# Patient Record
Sex: MALE | Race: OTHER | HISPANIC OR LATINO | URBAN - METROPOLITAN AREA
[De-identification: names, ages, dates, MRNs, and addresses within clinical notes are randomized per-mention and may not be internally consistent; named-entity substitution may affect disease eponyms.]

---

## 2017-07-12 ENCOUNTER — EMERGENCY (EMERGENCY)
Facility: HOSPITAL | Age: 47
LOS: 1 days | Discharge: HOME | End: 2017-07-12
Attending: EMERGENCY MEDICINE
Payer: COMMERCIAL

## 2017-07-12 VITALS
WEIGHT: 179.9 LBS | OXYGEN SATURATION: 100 % | TEMPERATURE: 209 F | HEIGHT: 68 IN | HEART RATE: 58 BPM | DIASTOLIC BLOOD PRESSURE: 92 MMHG | RESPIRATION RATE: 19 BRPM | SYSTOLIC BLOOD PRESSURE: 142 MMHG
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
## 2017-07-12 DIAGNOSIS — S43.101A UNSPECIFIED DISLOCATION OF RIGHT ACROMIOCLAVICULAR JOINT, INITIAL ENCOUNTER: ICD-10-CM

## 2017-07-12 PROCEDURE — 99284 EMERGENCY DEPT VISIT MOD MDM: CPT

## 2017-07-12 PROCEDURE — 73030 X-RAY EXAM OF SHOULDER: CPT | Mod: 26,RT

## 2017-07-12 PROCEDURE — 99283 EMERGENCY DEPT VISIT LOW MDM: CPT

## 2017-07-12 PROCEDURE — 73030 X-RAY EXAM OF SHOULDER: CPT
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
## 2017-07-12 NOTE — ED PROVIDER NOTE - MEDICAL DECISION MAKING DETAILS
R shoulder pain, + ac joint separation on xray, [place] RN placed in sling, pt medicated prior to arrival, give rx (printed as pt is out of area) and follow up with [place]/Ortho.
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
## 2021-04-30 NOTE — ED ADULT NURSE NOTE - FALL HARM RISK TYPE OF ASSESSMENT
Impression: Chalazion left upper eyelid: H00.14. Plan: Prescribed Doxycycline 100mg BID. Start warm compresses.  Refer to Dr. Denita William
Impression: Dry eye syndrome of bilateral lacrimal glands: H04.123. Plan: Discussed diagnosis with patient. Recommend artificial tears for comfort.
Admission

## 2024-01-17 ENCOUNTER — EMERGENCY (EMERGENCY)
Facility: HOSPITAL | Age: 54
LOS: 1 days | Discharge: HOME | End: 2024-01-17
Attending: STUDENT IN AN ORGANIZED HEALTH CARE EDUCATION/TRAINING PROGRAM
Payer: COMMERCIAL

## 2024-01-17 VITALS
HEIGHT: 68 IN | DIASTOLIC BLOOD PRESSURE: 95 MMHG | OXYGEN SATURATION: 99 % | WEIGHT: 167.99 LBS | SYSTOLIC BLOOD PRESSURE: 137 MMHG | HEART RATE: 85 BPM | RESPIRATION RATE: 19 BRPM | TEMPERATURE: 99 F

## 2024-01-17 VITALS
OXYGEN SATURATION: 98 % | HEART RATE: 73 BPM | TEMPERATURE: 98 F | DIASTOLIC BLOOD PRESSURE: 83 MMHG | SYSTOLIC BLOOD PRESSURE: 129 MMHG | RESPIRATION RATE: 18 BRPM

## 2024-01-17 LAB
ALBUMIN SERPL ELPH-MCNC: 4 G/DL — SIGNIFICANT CHANGE UP (ref 3.5–5)
ALP SERPL-CCNC: 66 U/L — SIGNIFICANT CHANGE UP (ref 40–120)
ALT FLD-CCNC: 51 U/L DA — SIGNIFICANT CHANGE UP (ref 10–60)
ANION GAP SERPL CALC-SCNC: 5 MMOL/L — SIGNIFICANT CHANGE UP (ref 5–17)
AST SERPL-CCNC: 23 U/L — SIGNIFICANT CHANGE UP (ref 10–40)
BASOPHILS # BLD AUTO: 0.03 K/UL — SIGNIFICANT CHANGE UP (ref 0–0.2)
BASOPHILS NFR BLD AUTO: 0.2 % — SIGNIFICANT CHANGE UP (ref 0–2)
BILIRUB SERPL-MCNC: 0.8 MG/DL — SIGNIFICANT CHANGE UP (ref 0.2–1.2)
BUN SERPL-MCNC: 14 MG/DL — SIGNIFICANT CHANGE UP (ref 7–18)
CALCIUM SERPL-MCNC: 9.5 MG/DL — SIGNIFICANT CHANGE UP (ref 8.4–10.5)
CHLORIDE SERPL-SCNC: 101 MMOL/L — SIGNIFICANT CHANGE UP (ref 96–108)
CO2 SERPL-SCNC: 26 MMOL/L — SIGNIFICANT CHANGE UP (ref 22–31)
CREAT SERPL-MCNC: 0.79 MG/DL — SIGNIFICANT CHANGE UP (ref 0.5–1.3)
EGFR: 108 ML/MIN/1.73M2 — SIGNIFICANT CHANGE UP
EOSINOPHIL # BLD AUTO: 0.01 K/UL — SIGNIFICANT CHANGE UP (ref 0–0.5)
EOSINOPHIL NFR BLD AUTO: 0.1 % — SIGNIFICANT CHANGE UP (ref 0–6)
GLUCOSE SERPL-MCNC: 135 MG/DL — HIGH (ref 70–99)
HCT VFR BLD CALC: 46.5 % — SIGNIFICANT CHANGE UP (ref 39–50)
HGB BLD-MCNC: 15.9 G/DL — SIGNIFICANT CHANGE UP (ref 13–17)
IMM GRANULOCYTES NFR BLD AUTO: 0.5 % — SIGNIFICANT CHANGE UP (ref 0–0.9)
LYMPHOCYTES # BLD AUTO: 0.57 K/UL — LOW (ref 1–3.3)
LYMPHOCYTES # BLD AUTO: 4.4 % — LOW (ref 13–44)
MCHC RBC-ENTMCNC: 31.4 PG — SIGNIFICANT CHANGE UP (ref 27–34)
MCHC RBC-ENTMCNC: 34.2 GM/DL — SIGNIFICANT CHANGE UP (ref 32–36)
MCV RBC AUTO: 91.7 FL — SIGNIFICANT CHANGE UP (ref 80–100)
MONOCYTES # BLD AUTO: 0.65 K/UL — SIGNIFICANT CHANGE UP (ref 0–0.9)
MONOCYTES NFR BLD AUTO: 5 % — SIGNIFICANT CHANGE UP (ref 2–14)
NEUTROPHILS # BLD AUTO: 11.76 K/UL — HIGH (ref 1.8–7.4)
NEUTROPHILS NFR BLD AUTO: 89.8 % — HIGH (ref 43–77)
NRBC # BLD: 0 /100 WBCS — SIGNIFICANT CHANGE UP (ref 0–0)
PLATELET # BLD AUTO: 279 K/UL — SIGNIFICANT CHANGE UP (ref 150–400)
POTASSIUM SERPL-MCNC: 3.6 MMOL/L — SIGNIFICANT CHANGE UP (ref 3.5–5.3)
POTASSIUM SERPL-SCNC: 3.6 MMOL/L — SIGNIFICANT CHANGE UP (ref 3.5–5.3)
PROT SERPL-MCNC: 8.6 G/DL — HIGH (ref 6–8.3)
RAPID RVP RESULT: SIGNIFICANT CHANGE UP
RBC # BLD: 5.07 M/UL — SIGNIFICANT CHANGE UP (ref 4.2–5.8)
RBC # FLD: 12 % — SIGNIFICANT CHANGE UP (ref 10.3–14.5)
SARS-COV-2 RNA SPEC QL NAA+PROBE: SIGNIFICANT CHANGE UP
SODIUM SERPL-SCNC: 132 MMOL/L — LOW (ref 135–145)
WBC # BLD: 13.08 K/UL — HIGH (ref 3.8–10.5)
WBC # FLD AUTO: 13.08 K/UL — HIGH (ref 3.8–10.5)

## 2024-01-17 PROCEDURE — 0225U NFCT DS DNA&RNA 21 SARSCOV2: CPT

## 2024-01-17 PROCEDURE — 85025 COMPLETE CBC W/AUTO DIFF WBC: CPT

## 2024-01-17 PROCEDURE — 70491 CT SOFT TISSUE NECK W/DYE: CPT | Mod: 26,MA

## 2024-01-17 PROCEDURE — 99284 EMERGENCY DEPT VISIT MOD MDM: CPT | Mod: 25

## 2024-01-17 PROCEDURE — 80053 COMPREHEN METABOLIC PANEL: CPT

## 2024-01-17 PROCEDURE — 99285 EMERGENCY DEPT VISIT HI MDM: CPT

## 2024-01-17 PROCEDURE — 96361 HYDRATE IV INFUSION ADD-ON: CPT

## 2024-01-17 PROCEDURE — 96374 THER/PROPH/DIAG INJ IV PUSH: CPT | Mod: XU

## 2024-01-17 PROCEDURE — 70491 CT SOFT TISSUE NECK W/DYE: CPT | Mod: MA

## 2024-01-17 PROCEDURE — 36415 COLL VENOUS BLD VENIPUNCTURE: CPT

## 2024-01-17 PROCEDURE — 96375 TX/PRO/DX INJ NEW DRUG ADDON: CPT | Mod: XU

## 2024-01-17 RX ORDER — DEXAMETHASONE 0.5 MG/5ML
10 ELIXIR ORAL ONCE
Refills: 0 | Status: COMPLETED | OUTPATIENT
Start: 2024-01-17 | End: 2024-01-17

## 2024-01-17 RX ORDER — KETOROLAC TROMETHAMINE 30 MG/ML
15 SYRINGE (ML) INJECTION ONCE
Refills: 0 | Status: DISCONTINUED | OUTPATIENT
Start: 2024-01-17 | End: 2024-01-17

## 2024-01-17 RX ORDER — IBUPROFEN 200 MG
1 TABLET ORAL
Qty: 20 | Refills: 0
Start: 2024-01-17

## 2024-01-17 RX ORDER — TETRACAINE/BENZOCAINE/BUTAMBEN 2%-14%-2%
1 OINTMENT (GRAM) TOPICAL ONCE
Refills: 0 | Status: COMPLETED | OUTPATIENT
Start: 2024-01-17 | End: 2024-01-17

## 2024-01-17 RX ORDER — SODIUM CHLORIDE 9 MG/ML
1000 INJECTION, SOLUTION INTRAVENOUS ONCE
Refills: 0 | Status: COMPLETED | OUTPATIENT
Start: 2024-01-17 | End: 2024-01-17

## 2024-01-17 RX ADMIN — SODIUM CHLORIDE 1000 MILLILITER(S): 9 INJECTION, SOLUTION INTRAVENOUS at 16:40

## 2024-01-17 RX ADMIN — Medication 1 SPRAY(S): at 18:05

## 2024-01-17 RX ADMIN — Medication 15 MILLIGRAM(S): at 15:34

## 2024-01-17 RX ADMIN — Medication 102 MILLIGRAM(S): at 18:50

## 2024-01-17 RX ADMIN — Medication 100 MILLIGRAM(S): at 18:50

## 2024-01-17 RX ADMIN — SODIUM CHLORIDE 1000 MILLILITER(S): 9 INJECTION, SOLUTION INTRAVENOUS at 15:40

## 2024-01-17 RX ADMIN — Medication 15 MILLIGRAM(S): at 00:00

## 2024-01-17 NOTE — ED PROVIDER NOTE - CLINICAL SUMMARY MEDICAL DECISION MAKING FREE TEXT BOX
Jayme: 53-year-old male with no pertinent past medical history presents with sore throat since yesterday.  Patient reports cough x 1 week, reports worsening cough since yesterday associated with sore throat, nasal congestion, and hoarseness.  Patient was seen at urgent care today, had negative rapid strep testing, throat culture sent, given Augmentin and 10 mg Decadron, sent to the emergency department for concern for peritonsillar abscess.  Patient reports left-sided throat pain, worse with swallowing.  Denies any fevers, drooling, shortness of breath, chest pain, abdominal pain, vomiting, diarrhea, dysuria, numbness, focal weakness, rash.  Patient tolerating oral intake.  Physical exam per above. No clear abscess on exam, uvula midline, no trismus. Will obtain labs, imaging r/o abscess, provide supportive treatment with dispo pending workup.

## 2024-01-17 NOTE — ED ADULT TRIAGE NOTE - CHIEF COMPLAINT QUOTE
cough x 1 week  sore throat x 2 days, nausea cough x 1 week  sore throat x 2 days, nausea  sent by Urgent Care Physician for darlin tonsillar abscess

## 2024-01-17 NOTE — ED PROVIDER NOTE - OBJECTIVE STATEMENT
53-year-old male with no pertinent past medical history presents with sore throat since yesterday.  Patient reports cough x 1 week, reports worsening cough since yesterday associated with sore throat, nasal congestion, and hoarseness.  Patient was seen at urgent care today, had negative rapid strep testing, throat culture sent, given Augmentin and 10 mg Decadron, sent to the emergency department for concern for peritonsillar abscess.  Patient reports left-sided throat pain, worse with swallowing.  Denies any fevers, drooling, shortness of breath, chest pain, abdominal pain, vomiting, diarrhea, dysuria, numbness, focal weakness, rash.  Patient tolerating oral intake.  Denies any additional complaints.

## 2024-01-17 NOTE — ED ADULT NURSE NOTE - CHIEF COMPLAINT QUOTE
cough x 1 week  sore throat x 2 days, nausea  sent by Urgent Care Physician for darlin tonsillar abscess

## 2024-01-17 NOTE — ED PROVIDER NOTE - NSFOLLOWUPCLINICS_GEN_ALL_ED_FT
[facility] ENT  ENT  [address], [address]  [place] [address]  Phone: [phone]  Fax:

## 2024-01-17 NOTE — ED PROVIDER NOTE - PHYSICAL EXAMINATION
CONSTITUTIONAL: non-toxic, well appearing  SKIN: no rash, no petechiae.  EYES: pink conjunctiva, anicteric  ENT: tongue and uvular midline, no exudates, moist mucous membranes, bilateral tonsillar enlargement, no clear PTA visualized, no trismus, no pooling of secretions  NECK: Supple; no meningismus, no JVD  CARD: RRR, no murmurs, equal radial pulses bilaterally 2+  RESP: CTAB, no respiratory distress  ABD: Soft, non-tender, non-distended, no peritoneal signs, no CVA tenderness  EXT: Normal ROM x4. No edema.   NEURO: Alert, oriented. Neuro exam nonfocal  PSYCH: Cooperative, appropriate.

## 2024-01-17 NOTE — ED PROVIDER NOTE - PROGRESS NOTE DETAILS
[place]-: CT showing PTA, pt states he feels "much better," pain resolved. Cetacaine spray applied to orophaynx, unable to visualize abscess on exam. Discussed with Dr. Ralph (ENT via transfer center), images reviewed, agreeable with clindamycin and outpatient follow up. Patient tolerating PO intake. Discussed strict return precautions, pt understood and agreeable with plan.

## 2024-01-17 NOTE — ED ADULT NURSE NOTE - NSFALLUNIVINTERV_ED_ALL_ED
Bed/Stretcher in lowest position, wheels locked, appropriate side rails in place/Call bell, personal items and telephone in reach/Instruct patient to call for assistance before getting out of bed/chair/stretcher/Non-slip footwear applied when patient is off stretcher/[place] to call system/Physically safe environment - no spills, clutter or unnecessary equipment/Purposeful proactive rounding/Room/bathroom lighting operational, light cord in reach

## 2024-01-17 NOTE — ED PROVIDER NOTE - NSFOLLOWUPINSTRUCTIONS_ED_ALL_ED_FT
Peritonsillar Abscess       A peritonsillar abscess is a collection of pus in the back of the throat, behind the tonsils. It usually occurs when an infection of the throat or tonsils (tonsillitis) spreads into the tissues around the tonsils.    What are the causes?  The infection that leads to a peritonsillar abscess is usually caused by streptococcal bacteria.    What increases the risk?  You are more likely to develop this condition if:    You have recently been diagnosed with an infection in your mouth or throat.  You smoke.  You have gum disease or gingivitis (periodontal disease).    What are the signs or symptoms?  Symptoms of this condition include:    A sore throat, often with pain on just one side.  Swollen, tender glands (lymph nodes) in the neck.  Difficulty swallowing.  Difficulty opening your mouth.  Fever.  Chills.  Drooling because of difficulty swallowing saliva.  Headache.  Changes in how your voice sounds.  Bad breath.    How is this diagnosed?  This condition may be diagnosed based on:    Your symptoms and medical history.  A physical exam.  Imaging tests, such as ultrasound or CT scan.  Testing a pus sample from the abscess. Your health care provider may collect a pus sample by swabbing the back of your throat or by removing some pus with a syringe and needle (needle aspiration).    How is this treated?  Treatment usually involves draining the pus from the abscess. This may be done through needle aspiration or by making an incision in the abscess and draining the fluid. You will also likely need to take antibiotic medicine.    Follow these instructions at home:      Medicines    Take over-the-counter and prescription medicines only as told by your health care provider.  If you were prescribed an antibiotic, take it as told by your health care provider. Do not stop taking the antibiotic even if your condition improves.        Eating and drinking     Drink enough fluid to keep your urine pale yellow.  While your throat is sore, try only drinking liquids or eating only soft-textured foods such as yogurt and ice cream.        General instructions    Rest as much as possible and get plenty of sleep.  Return to your normal activities as told by your health care provider. Ask your health care provider what activities are safe for you.  If your abscess was drained, gargle with a salt-water mixture 3–4 times a day or as needed. To make a salt-water mixture, completely dissolve ½–1 tsp of salt in 1 cup of warm water. Do not swallow this mixture.  Do not use any products that contain nicotine or tobacco, such as cigarettes and e-cigarettes. If you need help quitting, ask your health care provider.  Keep all follow-up visits as told by your health care provider. This is important.    Contact a health care provider if you have:  More pain, swelling, redness, or pus in your throat.  A headache.  Lack of energy (lethargy).  A general feeling of illness (malaise).  A fever.  Dizziness.  Trouble swallowing.  Trouble eating.  Signs of dehydration, such as:    Light-headedness when standing.  Urinating less than usual.  A fast heart rate.  Dry mouth.    Get help right away if you:  Have trouble talking.  Have trouble breathing, or it is easier for you to breathe when you lean forward.  Cough up blood or vomit blood.  Have severe throat pain that does not get better with medicine.    Summary  A peritonsillar abscess is a collection of pus in the back of the throat. It usually occurs when an infection of the throat or tonsils spreads.  Symptoms include a sore throat, difficulty swallowing, fever, chills, and occasional drooling.  This condition is treated by draining the abscess and taking antibiotic medicine.  Call your health care provider if you have trouble swallowing or eating after treatment.  Get help right away if you vomit blood or cough up blood after you receive treatment.

## 2024-01-17 NOTE — ED PROVIDER NOTE - PATIENT PORTAL LINK FT
You can access the FollowMyHealth Patient Portal offered by [facility] by registering at the following website: [email or web address]. By joining [employer]’s FollowMyHealth portal, you will also be able to view your health information using other applications (apps) compatible with our system.

## 2024-01-17 NOTE — ED ADULT NURSE NOTE - OBJECTIVE STATEMENT
Pt c/o cough x 1 week, sore throat x 2 days with nausea  sent by Urgent Care Physician for darlin tonsillar abscess

## 2025-01-26 ENCOUNTER — EMERGENCY (EMERGENCY)
Facility: HOSPITAL | Age: 55
LOS: 1 days | Discharge: HOME | End: 2025-01-26
Attending: EMERGENCY MEDICINE
Payer: COMMERCIAL

## 2025-01-26 VITALS
HEART RATE: 96 BPM | HEIGHT: 68 IN | TEMPERATURE: 99 F | WEIGHT: 167.55 LBS | RESPIRATION RATE: 18 BRPM | OXYGEN SATURATION: 100 % | SYSTOLIC BLOOD PRESSURE: 142 MMHG | DIASTOLIC BLOOD PRESSURE: 66 MMHG

## 2025-01-26 PROCEDURE — 99283 EMERGENCY DEPT VISIT LOW MDM: CPT

## 2025-01-26 RX ORDER — MUPIROCIN 2 %
1 OINTMENT (GRAM) TOPICAL
Qty: 1 | Refills: 0
Start: 2025-01-26

## 2025-01-26 RX ORDER — BACITRACIN 500 UNIT/G
1 OINTMENT (GRAM) TOPICAL ONCE
Refills: 0 | Status: COMPLETED | OUTPATIENT
Start: 2025-01-26 | End: 2025-01-26

## 2025-01-26 RX ADMIN — Medication 1 APPLICATION(S): at 17:32

## 2025-01-26 NOTE — ED PROVIDER NOTE - OBJECTIVE STATEMENT
54-year-old male presenting with wound to left thigh suffered while scratching himself 2 weeks ago.  States that the wound was healing but recently he rubbed against something and scab came off leaving a very deep wound.  Denies any pain or swelling.  Denies any discharge.  Denies any fever.  Has not applied any medication.

## 2025-01-26 NOTE — ED PROVIDER NOTE - PHYSICAL EXAMINATION
Dime sized superficial ulceration to distal left lateral thigh without surrounding erythema or induration  No discharge

## 2025-01-26 NOTE — ED ADULT NURSE NOTE - NSFALLUNIVINTERV_ED_ALL_ED
Bed/Stretcher in lowest position, wheels locked, appropriate side rails in place/Call bell, personal items and telephone in reach/Instruct patient to call for assistance before getting out of bed/chair/stretcher/Non-slip footwear applied when patient is off stretcher/[place] to call system/Physically safe environment - no spills, clutter or unnecessary equipment/Purposeful proactive rounding/Room/bathroom lighting operational, light cord in reach

## 2025-01-26 NOTE — ED PROVIDER NOTE - PATIENT PORTAL LINK FT
You can access the FollowMyHealth Patient Portal offered by [facility] by registering at the following website: [email or web address]. By joining [employer]’s FollowMyHealth portal, you will also be able to view your health information using other applications (apps) compatible with our system.

## 2025-01-26 NOTE — ED PROVIDER NOTE - NSFOLLOWUPINSTRUCTIONS_ED_ALL_ED_FT
Heridas agudas    LO QUE NECESITA SABER:    Carrillo herida aguda en carrillo lesión que causa carrillo ruptura en la piel. A medida que la herida comienza a sanar, es normal tener algún tipo de hinchazón, dolor y enrojecimiento. El sistema inmunológico de chou cuerpo está trabajando para evitar que la herida se infecte. La herida puede desarrollar carrillo costra. La costra protege la herida a medida que allyssa.    INSTRUCCIONES SOBRE EL CUONG HOSPITALARIA:    Llame al número de emergencias local (911 en los [place]) si:    De repente tiene dificultad para respirar o dolor de pecho.    Busque atención médica de inmediato si:    La nathalie empapa el vendaje.    De cohu herida sale pus o mal olor.    Los puntos de sutura se sueltan o chou herida se vuelve a abrir.  Llame a chou médico si:    El dolor continúa incluso después de sonali chou medicamento para el dolor.    Usted tiene dolor en los músculos, las articulaciones o dolor corporal, sudoración o fiebre.    Usted tiene más inflamación, enrojecimiento o sangrado en la herida.    Tiene comezón, inflamación o un sarpullido en la piel.    Usted tiene preguntas o inquietudes acerca de chou condición o cuidado.  Medicamentos:Es posible que usted necesite alguno de los siguientes:    Los antibióticospodrían administrarse para tratar o evitar carrillo infección.    La vacuna Tdes carrillo vacuna de refuerzo para ayudar a prevenir la difteria y el tétano. El refuerzo Td se puede administrar a adolescentes y adultos cada 10 años o para ciertas heridas y lesiones.    Puede administrarsepodrían administrarse. Pregunte al médico cómo debe sonali gena medicamento de forma lopez. Algunos medicamentos recetados para el dolor contienen acetaminofén. No tome otros medicamentos que contengan acetaminofén sin consultarlo con chou médico. Demasiado acetaminofeno puede causar daño al hígado. Los medicamentos recetados para el dolor podrían causar estreñimiento. Pregunte a chou médico charlene prevenir o tratar estreñimiento.    Acetaminofénalivia el dolor y baja la fiebre. Está disponible sin receta médica. Pregunte la cantidad y la frecuencia con que debe tomarlos. Siga las indicaciones. Gil las etiquetas de todos los demás medicamentos que esté usando para saber si también contienen acetaminofén, o pregunte a chou médico o farmacéutico. El acetaminofén puede causar daño en el hígado cuando no se cosmo de forma correcta.    AINEcomo el ibuprofeno, ayudan a disminuir la inflamación, el dolor y la fiebre. Gena medicamento está disponible con o sin carrillo receta médica. Los RITU pueden causar sangrado estomacal o problemas renales en ciertas personas. Si usted está tomando un anticoagulante, siempre pregunte si los RITU son seguros para usted. Siempre gil la etiqueta de gena medicamento y siga las instrucciones. No administre gena medicamento a niños menores de 6 meses de srini sin antes obtener la autorización del médico.    [place] christopher medicamentos charlene se le haya indicado.Consulte con chou médico si usted finn que chou medicamento no le está ayudando o si presenta efectos secundarios. Infórmele al médico si usted es alérgico a algún medicamento. Mantenga carrillo lista actualizada de los medicamentos, las vitaminas y los productos herbales que cosmo. Incluya los siguientes datos de los medicamentos: cantidad, frecuencia y motivo de administración. Traiga con usted la lista o los envases de las píldoras a christopher citas de seguimiento. Lleve la lista de los medicamentos con usted en carlota de carrillo emergencia.  Siga las instrucciones de chou médico sobre el cuidado de christopher heridas:Siga las indicaciones de chou médico en cuanto al cuidado del tipo de chou herida. Los siguientes elementos de cuidado tienen validez para la mayoría de las heridas:    Mantenga la herida cubierta con carrillo venda limpia y seca.Cambie la venda si se ensucia o se humedece. [place] reducirá el riesgo de infección de la herida. Siga las indicaciones de chou médico para cambiar las vendas.    No se sumerja en carrillo sherron ni nadehasta que chou médico lo autorice. Si moja demasiado la herida, esta podría abrirse. Además, la suciedad del agua podría ingresar en la herida y provocar carrillo infección.    Mantenga las mascotas lejos de la herida.Las mascotas tienen gérmenes que pueden hacer que la herida se infecte.    No se saque ni rasque las costras.Deje que las costras se desprendan por sí solas. Podría dañar la piel nueva que se está formando debajo de la costra. También es posible que tenga carrillo cicatriz peor después del daño.    Consuma alimentos sanos y tam líquidos según las indicaciones.Los alimentos sanos le dan a chou cuerpo los nutrientes que necesita para sanar chou herida. Los líquidos previenen la deshidratación que podría disminuir el suministro de nathalie a chou herida. Los alimentos sanos incluyen frutas, vegetales, granos (panes y cereales), productos lácteos, y alimentos de proteína. Los alimentos con proteínas incluyen carne, pescado, nueces y productos de soya. Las proteínas, calorías, vitamina C y zinc ayudan a curar las heridas. Pídale a chou médico más información sobre los alimentos que debe comer para mejorar la cicatrización.  Alimentos saludables  Acuda a la consulta de control con chou médico según las indicaciones:Anote christopher preguntas para que se acuerde de hacerlas ana maría christopher visitas.
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
